# Patient Record
Sex: FEMALE | Race: WHITE | Employment: FULL TIME | URBAN - METROPOLITAN AREA
[De-identification: names, ages, dates, MRNs, and addresses within clinical notes are randomized per-mention and may not be internally consistent; named-entity substitution may affect disease eponyms.]

---

## 2017-01-24 NOTE — TELEPHONE ENCOUNTER
Medication request and it was chosen but please verify the name difference.    estarylla 0.25-0.035 mg table #28, 12 refills, tabe one tablet by mouth one time daily.

## 2017-01-25 RX ORDER — NORGESTIMATE AND ETHINYL ESTRADIOL 0.25-0.035
1 KIT ORAL DAILY
COMMUNITY
End: 2017-01-25 | Stop reason: SDUPTHER

## 2017-01-25 RX ORDER — NORGESTIMATE AND ETHINYL ESTRADIOL 0.25-0.035
1 KIT ORAL DAILY
Qty: 3 DOSE PACK | Refills: 3 | Status: SHIPPED | OUTPATIENT
Start: 2017-01-25 | End: 2017-05-04 | Stop reason: SDUPTHER

## 2017-01-25 RX ORDER — NORGESTIMATE AND ETHINYL ESTRADIOL 0.25-0.035
1 KIT ORAL DAILY
Qty: 1 DOSE PACK | Refills: 12 | Status: CANCELLED | OUTPATIENT
Start: 2017-01-25

## 2017-01-25 NOTE — TELEPHONE ENCOUNTER
VO from Dr. Aguila Draft for pt's OCP to filled for one year and sent to the pt's preferred pharmacy.

## 2017-01-25 NOTE — TELEPHONE ENCOUNTER
Per call from [facility], patient insurance requires 90 day supply for  estarylla 0.25-0.035     Call [phone]    thanks

## 2017-05-04 ENCOUNTER — OFFICE VISIT (OUTPATIENT)
Dept: FAMILY MEDICINE CLINIC | Age: 37
End: 2017-05-04

## 2017-05-04 ENCOUNTER — HOSPITAL ENCOUNTER (OUTPATIENT)
Dept: LAB | Age: 37
Discharge: HOME | End: 2017-05-04
Payer: COMMERCIAL

## 2017-05-04 VITALS
HEART RATE: 90 BPM | OXYGEN SATURATION: 100 % | RESPIRATION RATE: 18 BRPM | TEMPERATURE: 97.8 F | DIASTOLIC BLOOD PRESSURE: 78 MMHG | WEIGHT: 165 LBS | BODY MASS INDEX: 32.39 KG/M2 | SYSTOLIC BLOOD PRESSURE: 123 MMHG | HEIGHT: 60 IN

## 2017-05-04 DIAGNOSIS — Z01.419 WELL WOMAN EXAM: Primary | ICD-10-CM

## 2017-05-04 DIAGNOSIS — Z01.419 WELL WOMAN EXAM WITH ROUTINE GYNECOLOGICAL EXAM: ICD-10-CM

## 2017-05-04 DIAGNOSIS — Z30.09 FAMILY PLANNING: ICD-10-CM

## 2017-05-04 PROCEDURE — 88175 CYTOPATH C/V AUTO FLUID REDO: CPT | Performed by: FAMILY MEDICINE

## 2017-05-04 PROCEDURE — 87624 HPV HI-RISK TYP POOLED RSLT: CPT | Performed by: FAMILY MEDICINE

## 2017-05-04 RX ORDER — NORGESTIMATE AND ETHINYL ESTRADIOL 0.25-0.035
1 KIT ORAL DAILY
Qty: 3 DOSE PACK | Refills: 3 | Status: SHIPPED | OUTPATIENT
Start: 2017-05-04 | End: 2018-07-17 | Stop reason: SDUPTHER

## 2017-05-04 NOTE — LETTER
5/9/2017 8:19 AM 
 
Ms. Brittny White 
[address] [facility] [address] [id number] Dear Brittny White: 
 
Please find your most recent results below. Resulted Orders LIPID PANEL Result Value Ref Range Cholesterol, total 182 100 - 199 mg/dL Triglyceride 109 0 - 149 mg/dL HDL Cholesterol 49 >39 mg/dL VLDL, calculated 22 5 - 40 mg/dL LDL, calculated 111 (H) 0 - 99 mg/dL Narrative Performed at:  [address]  [id number] : Fariha Acosta MD, Phone:  [phone] CBC W/O DIFF Result Value Ref Range WBC 10.2 3.4 - 10.8 x10E3/uL  
 RBC 4.56 3.77 - 5.28 x10E6/uL HGB 12.9 11.1 - 15.9 g/dL HCT 39.3 34.0 - 46.6 % MCV 86 79 - 97 fL  
 MCH 28.3 26.6 - 33.0 pg  
 MCHC 32.8 31.5 - 35.7 g/dL  
 RDW 13.7 12.3 - 15.4 % PLATELET 659 695 - 640 x10E3/uL Narrative Performed at:  [address]  [id number] : Fariha Acosta MD, Phone:  [phone] METABOLIC PANEL, COMPREHENSIVE Result Value Ref Range Glucose 89 65 - 99 mg/dL BUN 9 6 - 20 mg/dL Creatinine 0.73 0.57 - 1.00 mg/dL GFR est non- >59 mL/min/1.73 GFR est  >59 mL/min/1.73  
 BUN/Creatinine ratio 12 9 - 23 Sodium 140 134 - 144 mmol/L Potassium 4.4 3.5 - 5.2 mmol/L Chloride 101 96 - 106 mmol/L  
 CO2 23 18 - 29 mmol/L Calcium 8.8 8.7 - 10.2 mg/dL Protein, total 6.6 6.0 - 8.5 g/dL Albumin 4.1 3.5 - 5.5 g/dL GLOBULIN, TOTAL 2.5 1.5 - 4.5 g/dL A-G Ratio 1.6 1.2 - 2.2 Bilirubin, total 0.3 0.0 - 1.2 mg/dL Alk. phosphatase 76 39 - 117 IU/L  
 AST (SGOT) 16 0 - 40 IU/L  
 ALT (SGPT) 19 0 - 32 IU/L Narrative Performed at:  [address]  [id number] : Fariha Acosta MD, Phone:  [phone] CVD REPORT Result Value Ref Range INTERPRETATION Note Comment:  
   Supplement report is available. Narrative Performed at:  [address] [place] [address]  [id number] : Casandra Bernheim PhD, Phone:  [phone] Labs normal  
Still waiting on PAP smear Please call me if you have any questions: [phone] Sincerely, Fernando Goff MD

## 2017-05-04 NOTE — LETTER
5/18/2017 1:51 PM 
 
Ms. Fabiola To 
[address] [facility] [phone] Dear Fabiola To: 
 
Please find your most recent results below. Resulted Orders CX-VAG CYTOLOGY Narrative Jose [address]  [facility]      [address]         [address] [facility] [facility] [address]      [place], [address]     [place], [address] [facility] 
[phone] Fax# [phone]    Fax# [phone]      Fax# [phone] 
 
========================================================================== 
                       * * * CYTOPATHOLOGY REPORT* * *   
========================================================================== 
GYNECOLOGICAL * * *Procedures/Addenda Present * * * Patient:  Elizabeth Manning             Specimen #:  [id number] Age:  1980 (Age: 40)                Date of Procedure: 5/4/2017 Sex:  F                                  Date of Receipt:  5/5/2017 Hospital#:  [id number]               Date of Report: 5/9/2017 Med. Record #:  [id number] Location:  [facility]) Physician(s):  Glenna P. Angela Galeazzi, MD 
 
   
 
 
 
ADDITIONAL PATIENT INFORMATION:  
RFX 12 , 25 / 39 HPV REGARDLESS:  
YES  
SOURCE: 
A: Cervical/Endocervical Imaged Processed Thin Prep 
 
 
============================================================================ 
                                * * * FINAL INTERPRETATION* * * Cervical/Endocervical Imaged Processed Thin Prep Satisfactory for evaluation. Shift in uma suggestive of Bacterial vaginosis NEGATIVE FOR INTRAEPITHELIAL LESION OR MALIGNANCY. SANTINO Jain (ASCP HPV HR Interpretation Test: HPV, high-risk Result: NEGATIVE Reference Interval: Negative This high-risk HPV test detects fourteen high-risk types 
(16/18/31/33/35/39/45/51/52/56/58/59/66/68) without differentiation, using 
nucleic acid amplification method. Test performed by: [address] [place] [address]  Dr. Juan Tubbs [id number] [place] [address] Phone number:  [phone] Osvaldo Mean * * *Electronically Signed* * * 
5/9/2017 ICD10 Codes: 
 H76.584 The Pap test is a screening procedure to aid in the detection of cervical 
cancer and its precursors. It should not be used as the sole means of 
detecting cervical cancer. As with any laboratory test, false negative 
and false positive results are known to occur. RECOMMENDATIONS: 
 
PAP smear and HPV HR negative Recommend repeat PAP smear in one year Please call me if you have any questions: [phone] Sincerely, [facility] LAB OUTREACH INSURANCE

## 2017-05-04 NOTE — PROGRESS NOTES
Presents for annual exam    Has three children -- 8 9and 3years old -- boys are playing baseball    Been feeling well    Periods -- normal    On birth control -- desires refill    Hx abnormal PAP smear -- last year was MORENITA -- favor reactive, with negative stain for high risk; recommended repeat in 6 months; here today after 13 months    Denies any breast lesions    Pt states that she is fasting today      Subjective:   40 y.o. female for Well Woman Check. Patient's last menstrual period was 04/13/2017. Social History: single partner, contraception - OCP (Oral Contraceptive Pills). Pertinent past medical history:      Patient Active Problem List   Diagnosis Code    ASCUS with positive high risk HPV [id number]    HPV test positive [id number]    Atypical glandular cells of undetermined significance (MORENITA) on cervical Pap smear R87.619     Current Outpatient Prescriptions   Medication Sig Dispense Refill    norgestimate-ethinyl estradiol (ESTARYLLA) 0.25-35 mg-mcg tab Take 1 Tab by mouth daily. 3 Dose Pack 3     No Known Allergies     ROS:  Feeling well. No dyspnea or chest pain on exertion. No abdominal pain, change in bowel habits, black or bloody stools. No urinary tract symptoms. GYN ROS: normal menses, no abnormal bleeding, pelvic pain or discharge, no breast pain or new or enlarging lumps on self exam. No neurological complaints. Objective:     Visit Vitals    BP (!) 141/92 (BP 1 Location: Right arm, BP Patient Position: Sitting)    Pulse 90    Temp 97.8 °F (36.6 °C) (Oral)    Resp 18    Ht 5' (1.524 m)    Wt 165 lb (74.8 kg)    LMP 04/13/2017    SpO2 100%    BMI 32.22 kg/m2     The patient appears well, alert, oriented x 3, in no distress. ENT normal.  Neck supple. No adenopathy or thyromegaly. JASON. Lungs are clear, good air entry, no wheezes, rhonchi or rales. S1 and S2 normal, no murmurs, regular rate and rhythm. Abdomen soft without tenderness, guarding, mass or organomegaly. Extremities show no edema, normal peripheral pulses. Neurological is normal, no focal findings. BREAST EXAM: breasts appear normal, no suspicious masses, no skin or nipple changes or axillary nodes, benign appearing nevus on right nipple    PELVIC EXAM: normal external genitalia, vulva, vagina, cervix, uterus and adnexa    Assessment/Plan:   well woman  pap smear  return annually or prn    ICD-10-CM ICD-9-CM    1. Well woman exam Z01.419 V72.31 LIPID PANEL      CBC W/O DIFF      METABOLIC PANEL, COMPREHENSIVE      PAP IG, APTIMA HPV AND RFX 16/18,45 ([id number])   2. Family planning Z30.09 V25.09    3. Well woman exam with routine gynecological exam Z01.419 V72.31     [V72.31]     current treatment plan is effective, no change in therapy.

## 2017-05-04 NOTE — PROGRESS NOTES
Chief Complaint   Patient presents with    Well Woman     1. Have you been to the ER, urgent care clinic since your last visit? Hospitalized since your last visit? No    2. Have you seen or consulted any other health care providers outside of the [address] [place] [address] since your last visit? Include any pap smears or colon screening.  No

## 2017-05-04 NOTE — MR AVS SNAPSHOT
Visit Information Date & Time Provider Department Dept. Phone Encounter #  
 5/4/2017 10:20 AM Leno Gomez, [address] [facility] [phone] [id number] Follow-up Instructions Return in about 1 year (around 5/4/2018). Follow-up and Disposition History Upcoming Health Maintenance Date Due INFLUENZA AGE 9 TO ADULT 8/1/2017 PAP AKA CERVICAL CYTOLOGY 3/25/2019 DTaP/Tdap/Td series (2 - Td) 11/12/2024 Allergies as of 5/4/2017  Review Complete On: 5/4/2017 By: Leno Gomez MD  
 No Known Allergies Current Immunizations  Reviewed on 1/26/2015 Name Date Influenza Vaccine 10/21/2015, 10/1/2012 Influenza Vaccine (Quad) PF 11/12/2014 Tdap 11/12/2014 Not reviewed this visit You Were Diagnosed With   
  
 Codes Comments Well woman exam    -  Primary ICD-10-CM: Y03.313 ICD-9-CM: V72.31 Family planning     ICD-10-CM: Z30.09 
ICD-9-CM: V25.09 Well woman exam with routine gynecological exam     ICD-10-CM: D32.568 ICD-9-CM: V72.31 [V72.31] Vitals BP Pulse Temp Resp Height(growth percentile) Weight(growth percentile) 123/78 (BP 1 Location: Left arm, BP Patient Position: Sitting) 90 97.8 °F (36.6 °C) (Oral) 18 5' (1.524 m) 165 lb (74.8 kg) LMP SpO2 BMI OB Status Smoking Status 04/13/2017 100% 32.22 kg/m2 Having regular periods Passive Smoke Exposure - Never Smoker Vitals History BMI and BSA Data Body Mass Index Body Surface Area  
 32.22 kg/m 2 1.78 m 2 Preferred Pharmacy Pharmacy Name Phone [facility], [address] [place] [address] Your Updated Medication List  
  
   
This list is accurate as of: 5/4/17  4:41 PM.  Always use your most recent med list.  
  
  
  
  
 norgestimate-ethinyl estradiol 0.25-35 mg-mcg Tab Commonly known as:  ESTARYLLA Take 1 Tab by mouth daily. Prescriptions Sent to Pharmacy Refills  
 norgestimate-ethinyl estradiol (ESTARYLLA) 0.25-35 mg-mcg tab 3 Sig: Take 1 Tab by mouth daily. Class: Normal  
 Pharmacy[facility] [address], [address] #: [phone] Route: Oral  
  
We Performed the Following CBC W/O DIFF [57665 CPT(R)] LIPID PANEL [24463 CPT(R)] METABOLIC PANEL, COMPREHENSIVE [99606 CPT(R)] PAP IG, APTIMA HPV AND RFX 16/18,45 ([id number]) [[id number] Custom] Follow-up Instructions Return in about 1 year (around 5/4/2018). Introducing [facility]! Lucia Flores introduces [employer] patient portal. Now you can access parts of your medical record, email your doctor's office, and request medication refills online. 1. In your internet browser, go to [email or web address] 2. Click on the First Time User? Click Here link in the Sign In box. You will see the New Member Sign Up page. 3. Enter your [employer] Access Code exactly as it appears below. You will not need to use this code after youve completed the sign-up process. If you do not sign up before the expiration date, you must request a new code. · [employer] Access Code: [email or web address] Expires: 8/2/2017 11:12 AM 
 
4. Enter the last four digits of your Social Security Number (xxxx) and Date of Birth (mm/dd/yyyy) as indicated and click Submit. You will be taken to the next sign-up page. 5. Create a [employer] ID. This will be your [employer] login ID and cannot be changed, so think of one that is secure and easy to remember. 6. Create a [employer] password. You can change your password at any time. 7. Enter your Password Reset Question and Answer. This can be used at a later time if you forget your password. 8. Enter your e-mail address. You will receive e-mail notification when new information is available in [address] 9. Click Sign Up. You can now view and download portions of your medical record. 10. Click the Download Summary menu link to download a portable copy of your medical information. If you have questions, please visit the Frequently Asked Questions section of the Fio website. Remember, [employer] is NOT to be used for urgent needs. For medical emergencies, dial 911. Now available from your iPhone and Android! Please provide this summary of care documentation to your next provider. Your primary care clinician is listed as [address] [place] [address]. If you have any questions after today's visit, please call [phone].

## 2017-05-05 LAB
ALBUMIN SERPL-MCNC: 4.1 G/DL (ref 3.5–5.5)
ALBUMIN/GLOB SERPL: 1.6 {RATIO} (ref 1.2–2.2)
ALP SERPL-CCNC: 76 IU/L (ref 39–117)
ALT SERPL-CCNC: 19 IU/L (ref 0–32)
AST SERPL-CCNC: 16 IU/L (ref 0–40)
BILIRUB SERPL-MCNC: 0.3 MG/DL (ref 0–1.2)
BUN SERPL-MCNC: 9 MG/DL (ref 6–20)
BUN/CREAT SERPL: 12 (ref 9–23)
CALCIUM SERPL-MCNC: 8.8 MG/DL (ref 8.7–10.2)
CHLORIDE SERPL-SCNC: 101 MMOL/L (ref 96–106)
CHOLEST SERPL-MCNC: 182 MG/DL (ref 100–199)
CO2 SERPL-SCNC: 23 MMOL/L (ref 18–29)
CREAT SERPL-MCNC: 0.73 MG/DL (ref 0.57–1)
ERYTHROCYTE [DISTWIDTH] IN BLOOD BY AUTOMATED COUNT: 13.7 % (ref 12.3–15.4)
GLOBULIN SER CALC-MCNC: 2.5 G/DL (ref 1.5–4.5)
GLUCOSE SERPL-MCNC: 89 MG/DL (ref 65–99)
HCT VFR BLD AUTO: 39.3 % (ref 34–46.6)
HDLC SERPL-MCNC: 49 MG/DL
HGB BLD-MCNC: 12.9 G/DL (ref 11.1–15.9)
INTERPRETATION, 910389: NORMAL
LDLC SERPL CALC-MCNC: 111 MG/DL (ref 0–99)
MCH RBC QN AUTO: 28.3 PG (ref 26.6–33)
MCHC RBC AUTO-ENTMCNC: 32.8 G/DL (ref 31.5–35.7)
MCV RBC AUTO: 86 FL (ref 79–97)
PLATELET # BLD AUTO: 302 X10E3/UL (ref 150–379)
POTASSIUM SERPL-SCNC: 4.4 MMOL/L (ref 3.5–5.2)
PROT SERPL-MCNC: 6.6 G/DL (ref 6–8.5)
RBC # BLD AUTO: 4.56 X10E6/UL (ref 3.77–5.28)
SODIUM SERPL-SCNC: 140 MMOL/L (ref 134–144)
TRIGL SERPL-MCNC: 109 MG/DL (ref 0–149)
VLDLC SERPL CALC-MCNC: 22 MG/DL (ref 5–40)
WBC # BLD AUTO: 10.2 X10E3/UL (ref 3.4–10.8)

## 2018-07-17 ENCOUNTER — OFFICE VISIT (OUTPATIENT)
Dept: FAMILY MEDICINE CLINIC | Age: 38
End: 2018-07-17

## 2018-07-17 ENCOUNTER — HOSPITAL ENCOUNTER (OUTPATIENT)
Dept: LAB | Age: 38
Discharge: HOME | End: 2018-07-17
Payer: COMMERCIAL

## 2018-07-17 VITALS
DIASTOLIC BLOOD PRESSURE: 84 MMHG | RESPIRATION RATE: 18 BRPM | HEART RATE: 104 BPM | SYSTOLIC BLOOD PRESSURE: 135 MMHG | TEMPERATURE: 98.2 F | HEIGHT: 60 IN | BODY MASS INDEX: 34.47 KG/M2 | OXYGEN SATURATION: 100 % | WEIGHT: 175.6 LBS

## 2018-07-17 DIAGNOSIS — Z01.419 WELL WOMAN EXAM WITH ROUTINE GYNECOLOGICAL EXAM: Primary | ICD-10-CM

## 2018-07-17 DIAGNOSIS — Z87.42 HX OF ABNORMAL CERVICAL PAP SMEAR: ICD-10-CM

## 2018-07-17 DIAGNOSIS — N92.0 MENORRHAGIA WITH REGULAR CYCLE: ICD-10-CM

## 2018-07-17 DIAGNOSIS — E66.09 CLASS 1 OBESITY DUE TO EXCESS CALORIES WITHOUT SERIOUS COMORBIDITY WITH BODY MASS INDEX (BMI) OF 32.0 TO 32.9 IN ADULT: ICD-10-CM

## 2018-07-17 LAB
HCG URINE, QL. (POC): NEGATIVE
VALID INTERNAL CONTROL?: YES

## 2018-07-17 PROCEDURE — 88175 CYTOPATH C/V AUTO FLUID REDO: CPT | Performed by: FAMILY MEDICINE

## 2018-07-17 PROCEDURE — 87624 HPV HI-RISK TYP POOLED RSLT: CPT | Performed by: FAMILY MEDICINE

## 2018-07-17 RX ORDER — NORGESTIMATE AND ETHINYL ESTRADIOL 0.25-0.035
1 KIT ORAL DAILY
Qty: 3 DOSE PACK | Refills: 3 | Status: SHIPPED | OUTPATIENT
Start: 2018-07-17 | End: 2019-06-18 | Stop reason: SDUPTHER

## 2018-07-17 NOTE — PROGRESS NOTES
HPI:  Arleen Walker is a 45 y.o. female presenting for well woman exam.     No complaints or new concerns. Rachele Cooney   - Has been on Estraylla  - Hx of irregular menses that can be heavier when not on OCPs  - On OCPs periods are lighter and not as long    GYN Hx: Onset of menses 5 YO, typically lasting 4-5 days, 28 days between cycles, menses flow light,  LMP 18    OB Hx:  [id number]  1st -   2nd - LTCS for placenta previa, HTN  3rd - RLTCS, HTN  Currently sexually active with one partners in last year, using OCPs/ condoms for family planning    Diet: Eats a lot of pasta, easy to cook meals, microwave, plenty of fruits/ vegetables, 2% milk, no sodas  Exercise: Somewhat, 3x week walking     Dental Exam: 1 year ago  Eye Exam: 1 year ago     Allergies- reviewed:   No Known Allergies    Medications- reviewed:   Current Outpatient Prescriptions   Medication Sig    norgestimate-ethinyl estradiol (ESTARYLLA) 0.25-35 mg-mcg tab Take 1 Tab by mouth daily. No current facility-administered medications for this visit. Past Medical History- reviewed:  Past Medical History:   Diagnosis Date    Abnormal Pap smear     H/O placenta previa          Past Surgical History- reviewed:   Past Surgical History:   Procedure Laterality Date     DELIVERY ONLY      HX GYN  ,      &          Family History - reviewed:  Family History   Problem Relation Age of Onset    Cancer Maternal Grandfather          Social History - reviewed:  Social History     Social History    Marital status:      Spouse name: N/A    Number of children: N/A    Years of education: N/A     Occupational History    Not on file.      Social History Main Topics    Smoking status: Passive Smoke Exposure - Never Smoker    Smokeless tobacco: Never Used    Alcohol use No    Drug use: No    Sexual activity: Yes     Partners: Male     Birth control/ protection: None     Other Topics Concern    Not on file Social History Narrative     Immunizations - reviewed:   Immunization History   Administered Date(s) Administered    Influenza Vaccine 10/01/2012, 10/21/2015    Influenza Vaccine (Quad) PF 11/12/2014    Tdap 11/12/2014     Flu:  This year  Tdap: 2014  Pneumovax: Not indicated  Zostervax: Not indicated      Health Maintenance reviewed -  Pap smear 2016, atypical cells, HPV HR neg, colpo showed atypical cells no MENG, repeat PAP 6 mo later normal, never had repeat PAP at 12 mo  Mammogram No fam hx        Colonoscopy No fam hx  DEXA scan Not indicated   HIV testing Not interested   Hepatitis C testing Not inidicated  Lung cancer screening Not indicated      Review of Systems   Gen: Denies fever, chills, sick contacts  Heme: Denies easy bleeding, bruising  Endocrine: Denies significant weight loss, gain  Cardio: Denies chest pain, palpitations  Lungs: Denies shortness of breath, cough  GI: Denies abdominal pain, melena, n/v, d/c  : Denies dysuria, hematuria  MSK: Denies cramping, weakness  Neuro: Denies vision changes, numbness  Psych: Denies depressive sx, anxiety or trouble sleeping  BREASTS: No masses or nipple discharge    Physical Exam  Visit Vitals    /84 (BP 1 Location: Right arm, BP Patient Position: Sitting)    Pulse (!) 104    Temp 98.2 °F (36.8 °C) (Oral)    Resp 18    Ht 5' (1.524 m)    Wt 175 lb 9.6 oz (79.7 kg)    LMP 07/02/2018    SpO2 100%    BMI 34.29 kg/m2       General appearance - alert, well appearing, and in no distress  Eyes - pupils equal and reactive, extraocular eye movements intact  Ears - bilateral TM's and external ear canals normal  Nose - normal and patent, no erythema, discharge or polyps  Mouth - mucous membranes moist, pharynx normal without lesions  Neck - supple, no significant adenopathy  Chest - clear to auscultation, no wheezes, rales or rhonchi, symmetric air entry  Heart - normal rate, regular rhythm, normal S1, S2, no murmurs, rubs, clicks or gallops  Abdomen - soft, nontender, nondistended, no masses or organomegaly  Neurological - alert, oriented, normal speech, no focal findings or movement disorder noted  Musculoskeletal - no joint tenderness, deformity or swelling  Extremities - peripheral pulses normal, no pedal edema, no clubbing or cyanosis  Skin - normal coloration and turgor, no rashes, no suspicious skin lesions noted  Pelvic - Exam chaperoned by Axel Win LPN. External genitalia normal without rashes or lesions. Pink and moist vaginal mucosa. Scant white discharge. Cervix without lesions or abnormal discharge. Uterus non tender and normal size. No adnexal masses or tenderness. Assessment/Plan:    ICD-10-CM ICD-9-CM    1. Well woman exam with routine gynecological exam Z01.419 V72.31 PAP IG, APTIMA HPV AND RFX 16/18,45 ([id number])      CBC W/O DIFF      METABOLIC PANEL, BASIC      HEMOGLOBIN A1C WITH EAG      LIPID PANEL   2. Menorrhagia with regular cycle N92.0 626.2 norgestimate-ethinyl estradiol (ESTARYLLA) 0.25-35 mg-mcg tab      AMB POC URINE PREGNANCY TEST, VISUAL COLOR COMPARISON   3. Class 1 obesity due to excess calories without serious comorbidity with body mass index (BMI) of 32.0 to 32.9 in adult E66.09 278.00     Z68.32 V85.32    4. Hx of abnormal cervical Pap smear Z87.898 V13.29      Well Woman Exam   - Hx of Atypical Cells, HPV neg. PAP at 12 mo was normal. Needs repeat PAP today following ASCCP guidelines. - BMI obese - recommend healthy diet and exercise  - CBC, BMP, HgA1c, lipid panel today  - UTD on Flu and Tdap    Mennorhagia  - UPT neg  - Refill Estarylla 0.25-35mg-mcg tablet, 3 dose pack, 3 refills    Follow up in 1 year for next well woman exam    I have discussed the diagnosis with the patient and the intended plan as seen in the above orders. Patient verbalized understanding of the plan and agrees with the plan. The patient has received an after-visit summary and questions were answered concerning future plans.   I have discussed medication side effects and warnings with the patient as well. Informed patient to return to the office if new symptoms arise.     Patient discussed with Omkar Aly MD (Attending)    Liborio Morrow DO  Family Medicine Resident

## 2018-07-17 NOTE — PROGRESS NOTES
Identified Patient with two Patient identifiers (Name and ). Two Patient Identifiers confirmed. Reviewed record in preparation for visit and have obtained necessary documentation. Chief Complaint   Patient presents with    Well Woman       Visit Vitals    Ht 5' (1.524 m)    Wt 175 lb 9.6 oz (79.7 kg)    BMI 34.29 kg/m2       1. Have you been to the ER, urgent care clinic since your last visit? Hospitalized since your last visit? No    2. Have you seen or consulted any other health care providers outside of the [address] [place] [address] since your last visit? Include any pap smears or colon screening.  No

## 2018-07-17 NOTE — PATIENT INSTRUCTIONS

## 2018-07-18 LAB
BUN SERPL-MCNC: 7 MG/DL (ref 6–20)
BUN/CREAT SERPL: 11 (ref 9–23)
CALCIUM SERPL-MCNC: 8.6 MG/DL (ref 8.7–10.2)
CHLORIDE SERPL-SCNC: 103 MMOL/L (ref 96–106)
CHOLEST SERPL-MCNC: 176 MG/DL (ref 100–199)
CO2 SERPL-SCNC: 21 MMOL/L (ref 20–29)
CREAT SERPL-MCNC: 0.65 MG/DL (ref 0.57–1)
ERYTHROCYTE [DISTWIDTH] IN BLOOD BY AUTOMATED COUNT: 13.9 % (ref 12.3–15.4)
EST. AVERAGE GLUCOSE BLD GHB EST-MCNC: 100 MG/DL
GLUCOSE SERPL-MCNC: 98 MG/DL (ref 65–99)
HBA1C MFR BLD: 5.1 % (ref 4.8–5.6)
HCT VFR BLD AUTO: 40.1 % (ref 34–46.6)
HDLC SERPL-MCNC: 45 MG/DL
HGB BLD-MCNC: 13.2 G/DL (ref 11.1–15.9)
INTERPRETATION, 910389: NORMAL
LDLC SERPL CALC-MCNC: 108 MG/DL (ref 0–99)
MCH RBC QN AUTO: 29.1 PG (ref 26.6–33)
MCHC RBC AUTO-ENTMCNC: 32.9 G/DL (ref 31.5–35.7)
MCV RBC AUTO: 88 FL (ref 79–97)
PLATELET # BLD AUTO: 294 X10E3/UL (ref 150–379)
POTASSIUM SERPL-SCNC: 4.1 MMOL/L (ref 3.5–5.2)
RBC # BLD AUTO: 4.54 X10E6/UL (ref 3.77–5.28)
SODIUM SERPL-SCNC: 143 MMOL/L (ref 134–144)
TRIGL SERPL-MCNC: 113 MG/DL (ref 0–149)
VLDLC SERPL CALC-MCNC: 23 MG/DL (ref 5–40)
WBC # BLD AUTO: 9.8 X10E3/UL (ref 3.4–10.8)

## 2018-07-23 ENCOUNTER — TELEPHONE (OUTPATIENT)
Dept: FAMILY MEDICINE CLINIC | Age: 38
End: 2018-07-23

## 2018-07-23 NOTE — TELEPHONE ENCOUNTER
7/23/2018 4:56 PM    Called patient to discuss recent lab results. Went over blood work. PAP showed MORENITA and based on ASCCP guidelines will need repeat colpo. Scheduled for August 3rd at 10:30 AM. Have discussed with Dr. Franck Stinson who will be on this day.     Aniyah Lyles DO  Family Med Resident, PGY2

## 2018-08-03 ENCOUNTER — OFFICE VISIT (OUTPATIENT)
Dept: FAMILY MEDICINE CLINIC | Age: 38
End: 2018-08-03

## 2018-08-03 VITALS
HEART RATE: 88 BPM | HEIGHT: 60 IN | RESPIRATION RATE: 17 BRPM | SYSTOLIC BLOOD PRESSURE: 119 MMHG | BODY MASS INDEX: 33.96 KG/M2 | WEIGHT: 173 LBS | DIASTOLIC BLOOD PRESSURE: 75 MMHG | OXYGEN SATURATION: 99 % | TEMPERATURE: 98.3 F

## 2018-08-03 DIAGNOSIS — R87.619 ATYPICAL GLANDULAR CELLS OF UNDETERMINED SIGNIFICANCE (AGUS) ON CERVICAL PAP SMEAR: Primary | ICD-10-CM

## 2018-08-03 LAB
HCG URINE, QL. (POC): NEGATIVE
VALID INTERNAL CONTROL?: YES

## 2018-08-03 NOTE — PROGRESS NOTES
Chief Complaint Patient presents with  Colposcopy 1. Have you been to the ER, urgent care clinic since your last visit? Hospitalized since your last visit? No 
 
2. Have you seen or consulted any other health care providers outside of the [address] [place] [address] since your last visit? Include any pap smears or colon screening.  No

## 2018-08-03 NOTE — PROGRESS NOTES
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 Discussed concerns about repeating endometrial biopsy and colpo including sample size and if we may be missing cancer not sampling the entire endometrial lining. Discussed her options for care including following up with Rayray Benz for possible D&C which would provide better sample size of cells or repeating colpo and endometrial biopsy today. After discussing her options and risks/ benefits of both patient has opted to follow up with OB/ GYN. Will provide referral for OB/ GYN. Patient seen and discussed with Dr. Dat Maya DO (Attending) Pauline Burger DO Family Med Resident, PGY2

## 2018-08-08 ENCOUNTER — TELEPHONE (OUTPATIENT)
Dept: OBGYN CLINIC | Age: 38
End: 2018-08-08

## 2018-08-08 NOTE — TELEPHONE ENCOUNTER
Call received at 3:06PM      45year old patient calling to say that she needs to reschedule her appointment for the colposcopy due to being on her cycle. Patient placed on the schedule for 8/27/18 at 3:40PM.    Patient advised to take Motrin one hour prior to the procedure. Patient verbalized understanding.

## 2018-08-27 ENCOUNTER — HOSPITAL ENCOUNTER (OUTPATIENT)
Dept: LAB | Age: 38
Discharge: HOME | End: 2018-08-27

## 2018-08-27 ENCOUNTER — OFFICE VISIT (OUTPATIENT)
Dept: OBGYN CLINIC | Age: 38
End: 2018-08-27

## 2018-08-27 VITALS
SYSTOLIC BLOOD PRESSURE: 122 MMHG | HEIGHT: 60 IN | BODY MASS INDEX: 34.51 KG/M2 | DIASTOLIC BLOOD PRESSURE: 82 MMHG | WEIGHT: 175.8 LBS

## 2018-08-27 DIAGNOSIS — Z87.42 HISTORY OF ABNORMAL CERVICAL PAP SMEAR: ICD-10-CM

## 2018-08-27 DIAGNOSIS — Z32.02 NEGATIVE PREGNANCY TEST: ICD-10-CM

## 2018-08-27 DIAGNOSIS — R87.619 ATYPICAL GLANDULAR CELLS OF UNDETERMINED SIGNIFICANCE (AGUS) ON CERVICAL PAP SMEAR: Primary | ICD-10-CM

## 2018-08-27 NOTE — PROGRESS NOTES
[facility]  OFFICE PROCEDURE PROGRESS NOTE        Chart reviewed for the following:   IChelsea LPN, have reviewed the History, Physical and updated the Allergic reactions for [address] performed immediately prior to start of procedure:   Keren Weir LPN, have performed the following reviews on [employer] prior to the start of the procedure:            * Patient was identified by name and date of birth   * Agreement on procedure being performed was verified  * Risks and Benefits explained to the patient  * Procedure site verified and marked as necessary  * Patient was positioned for comfort  * Consent was signed and verified     Time: 4:07 PM        Date of procedure: 2018    Procedure performed by:  Hannah Galan MD    How tolerated by patient: tolerated the procedure well with no complications    Post Procedural Pain Scale: 2 - Hurts Little Bit    Comments: none    Procedure note: Endometrial biopsy    Principal Financial is a ,  45 y.o. female [facility] whose Patient's last menstrual period was 2018 (exact date). was on . The patient has a history of There were no encounter diagnoses. and presents for an endometrial biopsy. Indications:   After the indications, risks, benefits, and alternatives to performing an endometrial biopsy were explained to the patient, her questions were answered and informed consent was obtained. Procedure: The patient was placed on the table in the dorsal lithotomy position. A bimanual exam showed the uterus to be anterior. The uterus was normal size. {Numbers 1-15,20,30:16485} weeks  size. A speculum was placed in the vagina. The cervix was visualized and prepped with zephrin. A tenaculum was placed on the anterior lip of the cervix for traction. It was not necessary to dilate the cervix. A pipelle was passed through the endocervical canal without difficulty.  The uterus was sounded to {NUMBERS; 1-9.5 BY 3.0:54335} cm's. A moderate amount of tissue was returned. This tissue was placed in formalin and sent to pathology. It was felt that an adequate sample was obtained. due to cervical stenosis. The patient tolerated the procedure well and she reported mild cramping. The tenaculum and speculum were removed. Post Procedural Status: The patient was observed for {Numbers 1-15,20,30:24243}  {hrs/mins:14337. She had mild cramping at the time of discharge. There were no complications. The patient was discharged in stable condition. Procedure Note: Colposcopy    Maria De Jesus Hickey is a ,  45 y.o. female [facility] whose Patient's last menstrual period was 2018 (exact date). was on . She presents for colposcopy for evaluation of a cervical abnormality with a pap smear abnormality consisting of MORENITA/-HPV obtained on 2018. Hx of Normal pap/-HPV on 2017. JO ANN/-HPV on 3/25/2016 Negative colposcopy and EMB on 2016. Cassandrakatiuska Mireles After being presented with the risks, benefits and alternatives has she signed a consent for the procedure. She states that she understands the need for the procedure and has no further questions. She was informed that she may experience discomfort. Procedure:  She was positioned in the dorsal lithotomy position and a speculum was inserted into the vagina. Dilute acetic acid was applied to the cervix. The colposcope was used to visualize the cervix. Procedure: The transformation zone was completely visualized. Findings: This colposcopy was satisfactory. Biopsies were taken from the cervix . See accompanying diagram for biopsy sites. Monsels was applied to the cervix. Endocervical currettage: An endocervical curettage was performed. Findings:The procedure was notable for white epithelium on the cervix. Post Procedure Status: The patient tolerated the procedure well with minimal discomfort.

## 2018-08-27 NOTE — PROGRESS NOTES
Abnormal pap evaluation     Subjective:      Slime Owens is a 45 y.o. female seen for evaluation of abnormal Pap smear. Most recent Pap smear 18 result: MORENITA neg HPV  Prior Pap result: Normal, HPV neg 2017. Prior cervical/vaginal disease: none. Prior cervical treatment: MORENITA pap, favor reactive 3/2016 and Colpo and emb were neg 2016  Cervical cancer risk factors: prior abnormal Pap smears    Past Medical History:   Diagnosis Date    Abnormal Pap smear     H/O placenta previa      Past Surgical History:   Procedure Laterality Date     DELIVERY ONLY      HX GYN  ,      &      Social History     Occupational History    Not on file. Social History Main Topics    Smoking status: Passive Smoke Exposure - Never Smoker    Smokeless tobacco: Never Used    Alcohol use No    Drug use: No    Sexual activity: Yes     Partners: Male     Birth control/ protection: None     Family History   Problem Relation Age of Onset    Cancer Maternal Grandfather        No Known Allergies  Prior to Admission medications    Medication Sig Start Date End Date Taking? Authorizing Provider   norgestimate-ethinyl estradiol (ESTARYLLA) 0.25-35 mg-mcg tab Take 1 Tab by mouth daily.  18 Yes Parish Blank, DO        Review of Systems - History obtained from the patient-negative for:  Constitutional: weight loss, fever, night sweats  GI: change in bowel habits, abdominal pain, black or bloody stools  : frequency, dysuria, hematuria, vaginal discharge  MSK: back pain, joint pain, muscle pain  Skin: itching, rash, hives  Neuro: dizziness, headache, confusion, weakness  Psych: anxiety, depression, change in mood  Heme/lymph: bleeding, bruising, pallor       Objective:     Visit Vitals    /82    Ht 5' (1.524 m)    Wt 175 lb 12.8 oz (79.7 kg)    LMP 2018 (Exact Date)    BMI 34.33 kg/m2        Physical Exam:     Constitutional  · Appearance: well-nourished, well developed, alert, in no acute distress    HENT  · Head and Face: appears normal    Gastrointestinal  · Abdominal Examination: abdomen non-tender to palpation, normal bowel sounds, no masses present  · Liver and spleen: no hepatomegaly present, spleen not palpable  · Hernias: no hernias identified    Genitourinary  · External Genitalia: normal appearance for age, no discharge present, no tenderness present, no inflammatory lesions present, no masses present, no atrophy present  · Vagina: normal vaginal vault without central or paravaginal defects, no discharge present, no inflammatory lesions present, no masses present  · Bladder: non-tender to palpation  · Urethra: appears normal  · Cervix: normal   · Perineum: perineum within normal limits, no evidence of trauma, no rashes or skin lesions present  · Anus: anus within normal limits, no hemorrhoids present  · Inguinal Lymph Nodes: no lymphadenopathy present    Skin  · General Inspection: no rash, no lesions identified    Neurologic/Psychiatric  · Mental Status:  · Orientation: grossly oriented to person, place and time  · Mood and Affect: mood normal, affect appropriate            Assessment/Plan:   MORENITA pap - prior MORENITA favor reactive in 2016 with neg bx., pap normal HPV margarito 2017    P: colpo and end bx done today  Will schedule screening mammo  Await path      [facility]  OFFICE PROCEDURE PROGRESS NOTE        Chart reviewed for the following:   I, April Clare, have reviewed the History, Physical and updated the Allergic reactions for [address] performed immediately prior to start of procedure:   I April Clare, have performed the following reviews on Buzz Chen prior to the start of the procedure:            * Patient was identified by name and date of birth   * Agreement on procedure being performed was verified  * Risks and Benefits explained to the patient  * Procedure site verified and marked as necessary  * Patient was positioned for comfort  * Consent was signed and verified     Time: 4:33pm      Date of procedure: 8/27/2018    Procedure performed by:  Craig Mckeon MD    Patient assisted by: self    How tolerated by patient: tolerated the procedure well with no complications    Post Procedural Pain Scale: 2 - Hurts Little Bit    Comments: none    Procedure Note: Colposcopy    Kris Handley is a G4 ,  45 y.o. female [facility] whose Patient's last menstrual period was 07/31/2018 (exact date). She presents for colposcopy for evaluation of a cervical abnormality with a pap smear abnormality consisting of MORENITA neg HPV. After being presented with the risks, benefits and alternatives has she signed a consent for the procedure. She states that she understands the need for the procedure and has no further questions. She was informed that she may experience discomfort. Procedure:  She was positioned in the dorsal lithotomy position and a speculum was inserted into the vagina. Dilute acetic acid was applied to the cervix. The colposcope was used to visualize the cervix. Procedure: The transformation zone was completely visualized. Findings: This colposcopy was satisfactory. A biopsy were taken from the cervix . See accompanying diagram for biopsy sites. Monsels was applied to the cervix. Endocervical currettage: An endocervical curettage was performed. Findings:The procedure was notable for normal epithelium on the cervix. Post Procedure Status: The patient tolerated the procedure well with minimal discomfort. Procedure note: Endometrial biopsy    Kris Handley is a G4 [id number],  45 y.o. female [facility] whose Patient's last menstrual period was 07/31/2018 (exact date). The patient has a history of The primary encounter diagnosis was Atypical glandular cells of undetermined significance (MORENITA) on cervical Pap smear. A diagnosis of History of abnormal cervical Pap smear was also pertinent to this visit.  and presents for an endometrial biopsy. Indications:   After the indications, risks, benefits, and alternatives to performing an endometrial biopsy were explained to the patient, her questions were answered and informed consent was obtained. Procedure: The patient was placed on the table in the dorsal lithotomy position. The uterus was normal size. A speculum was placed in the vagina. The cervix was visualized and prepped with zephrin. A tenaculum was not needed for traction. It was not necessary to dilate the cervix. A pipelle was passed through the endocervical canal without difficulty. The uterus was sounded to 7 cm's. A moderate amount of tissue was returned. This tissue was placed in formalin and sent to pathology. It was felt that an adequate sample was obtained. The patient tolerated the procedure well and she reported mild cramping. The speculum was removed. Post Procedural Status: The patient was observed for 15 mins. She had no cramping at the time of discharge. There were no complications. The patient was discharged in stable condition.

## 2018-08-28 LAB
HCG URINE, QL. (POC): NEGATIVE
VALID INTERNAL CONTROL?: YES

## 2018-08-30 ENCOUNTER — APPOINTMENT (OUTPATIENT)
Dept: OBGYN CLINIC | Age: 38
End: 2018-08-30

## 2019-02-25 ENCOUNTER — OFFICE VISIT (OUTPATIENT)
Dept: OBGYN CLINIC | Age: 39
End: 2019-02-25

## 2019-02-25 VITALS
BODY MASS INDEX: 34.75 KG/M2 | WEIGHT: 177 LBS | HEIGHT: 60 IN | SYSTOLIC BLOOD PRESSURE: 110 MMHG | DIASTOLIC BLOOD PRESSURE: 70 MMHG

## 2019-02-25 DIAGNOSIS — Z86.19 HISTORY OF HPV INFECTION: ICD-10-CM

## 2019-02-25 DIAGNOSIS — Z87.42 HISTORY OF ABNORMAL CERVICAL PAP SMEAR: Primary | ICD-10-CM

## 2019-02-25 NOTE — PROGRESS NOTES
Chief Complaint   Follow-up      HPI  Jaquelin Bernard is a 45 y.o. female who presents for a 6 month follow up pap smear. She was last seen 2018   No LMP recorded. She has hx of ASCUS/+HPV, 2013  MORENITA/-HPV 3/25/2016 & 2018  EMB negative;Colposcopy revealed atypical detached cell 2018  Associated symptoms: none. Aggravating symptoms: none. Alleviating factors: none. Annual exam due 2019. Normal mammo 2018        Past Medical History:   Diagnosis Date    Abnormal Pap smear     H/O placenta previa      Past Surgical History:   Procedure Laterality Date     DELIVERY ONLY      HX COLPOSCOPY  2018    MORENITA pap--> colpo and EMB neg    HX GYN  ,      &      Social History     Occupational History    Not on file   Tobacco Use    Smoking status: Passive Smoke Exposure - Never Smoker    Smokeless tobacco: Never Used   Substance and Sexual Activity    Alcohol use: No    Drug use: No    Sexual activity: Yes     Partners: Male     Birth control/protection: None     Family History   Problem Relation Age of Onset    Cancer Maternal Grandfather        No Known Allergies  Prior to Admission medications    Medication Sig Start Date End Date Taking? Authorizing Provider   norgestimate-ethinyl estradiol (ESTARYLLA) 0.25-35 mg-mcg tab Take 1 Tab by mouth daily. 18  Cam Lemus DO        Review of Systems: History obtained from the patient  Constitutional: negative for weight loss, fever, night sweats  Breast: negative for breast lumps, nipple discharge, galactorrhea  GI: negative for change in bowel habits, abdominal pain, black or bloody stools  : negative for frequency, dysuria, hematuria, vaginal discharge  MSK: negative for back pain, joint pain, muscle pain  Skin: negative for itching, rash, hives  Psych: negative for anxiety, depression, change in mood      Objective: There were no vitals taken for this visit.     Physical Exam: PHYSICAL EXAMINATION    Constitutional  · Appearance: well-nourished, well developed, alert, in no acute distress    Gastrointestinal  · Abdominal Examination: abdomen non-tender to palpation, normal bowel sounds, no masses present  · Liver and spleen: no hepatomegaly present, spleen not palpable  · Hernias: no hernias identified    Genitourinary  · External Genitalia: normal appearance for age, no discharge present, no tenderness present, no inflammatory lesions present, no masses present, no atrophy present  · Vagina: normal vaginal vault without central or paravaginal defects, no discharge present, no inflammatory lesions present, no masses present  · Bladder: non-tender to palpation  · Urethra: appears normal  · Cervix: normal   · Uterus: normal size, shape and consistency  · Adnexa: no adnexal tenderness present, no adnexal masses present  · Perineum: perineum within normal limits, no evidence of trauma, no rashes or skin lesions present  · Anus: anus within normal limits, no hemorrhoids present  · Inguinal Lymph Nodes: no lymphadenopathy present    Skin  · General Inspection: no rash, no lesions identified    Neurologic/Psychiatric  · Mental Status:  · Orientation: grossly oriented to person, place and time  · Mood and Affect: mood normal, affect appropriate    Assessment:   MORENITA with normal workup    Plan:   Pap/HPV      RTO prn if symptoms persist or worsen. Instructions given to pt. Handouts given to pt.

## 2019-02-28 LAB
CYTOLOGIST CVX/VAG CYTO: NORMAL
CYTOLOGY CVX/VAG DOC THIN PREP: NORMAL
DX ICD CODE: NORMAL
HPV I/H RISK 1 DNA CVX QL PROBE+SIG AMP: NEGATIVE
Lab: NORMAL
OTHER STN SPEC: NORMAL
PATH REPORT.FINAL DX SPEC: NORMAL
STAT OF ADQ CVX/VAG CYTO-IMP: NORMAL

## 2019-06-18 ENCOUNTER — OFFICE VISIT (OUTPATIENT)
Dept: FAMILY MEDICINE CLINIC | Age: 39
End: 2019-06-18

## 2019-06-18 VITALS
WEIGHT: 175 LBS | OXYGEN SATURATION: 98 % | HEIGHT: 60 IN | RESPIRATION RATE: 16 BRPM | HEART RATE: 83 BPM | SYSTOLIC BLOOD PRESSURE: 120 MMHG | BODY MASS INDEX: 34.36 KG/M2 | TEMPERATURE: 98.5 F | DIASTOLIC BLOOD PRESSURE: 81 MMHG

## 2019-06-18 DIAGNOSIS — Z00.00 WELL WOMAN EXAM (NO GYNECOLOGICAL EXAM): Primary | ICD-10-CM

## 2019-06-18 DIAGNOSIS — N94.6 DYSMENORRHEA: ICD-10-CM

## 2019-06-18 DIAGNOSIS — N92.0 MENORRHAGIA WITH REGULAR CYCLE: ICD-10-CM

## 2019-06-18 LAB
HCG URINE, QL. (POC): NEGATIVE
VALID INTERNAL CONTROL?: YES

## 2019-06-18 RX ORDER — NORGESTIMATE AND ETHINYL ESTRADIOL 0.25-0.035
1 KIT ORAL DAILY
Qty: 3 DOSE PACK | Refills: 3 | Status: SHIPPED | OUTPATIENT
Start: 2019-06-18 | End: 2020-06-17

## 2019-06-18 NOTE — PROGRESS NOTES
HPI:  Monica Ruggiero is a 44 y.o. female presenting for well woman exam.       Concerns today:   Dysmenorrhea - stable. Needs refill of OCP; Has been on for about 4 years. Lifestyle:   Occupation: insurance billing for [employer]: fruits, vegetable, protein. Some carb intake. Drinks water. Exercise: walk about 45 minutes, 3-4 days/ week   Tobacco: never smoker  Alcohol: social drink  Drugs: never smoker       Health Maintenance:  Pap smear: neg pap and HPV 2019 with OBGYN  Mammogram: no masses. No FH of breast cancer  Colonoscopy: no melena. No FH of colon cancer  DEXA scan: not indicated until 73 y/o   HIV testing: Neg    Hepatitis C testing: not indicated. Born after 1965  Lung cancer screening: not indicated. Never smoker  Vision screening: about 1 year ago   Dental screening: about 6 months ago       Immunizations:   Immunization History   Administered Date(s) Administered    Influenza Vaccine 10/01/2012, 10/21/2015    Influenza Vaccine (Quad) PF 2014    Tdap 2014         Menstrual, Pregnancy, and Sexual Histories:  Age at which menses began: 5 y/o  Last menstrual period was: 19  Length of periods: 4-5 days  Number of days between periods: 28 days   Menstrual flow: regular on OCP         Sexually active?: yes   Number of sexual partners: 1  Type of sexual partners: male  Method of family planning: OCP      No Known Allergies      Current Outpatient Medications   Medication Sig    norgestimate-ethinyl estradiol (ESTARYLLA) 0.25-35 mg-mcg tab Take 1 Tab by mouth daily. No current facility-administered medications for this visit.           Past Medical History:   Diagnosis Date    Abnormal Pap smear     H/O placenta previa          Past Surgical History:   Procedure Laterality Date     DELIVERY ONLY      HX COLPOSCOPY  2018    MORENITA pap--> colpo and EMB neg    HX GYN  ,      &          Family History   Problem Relation Age of Onset    Cancer Maternal Grandfather          Social History     Socioeconomic History    Marital status:      Spouse name: Not on file    Number of children: Not on file    Years of education: Not on file    Highest education level: Not on file   Occupational History    Not on file   Social Needs    Financial resource strain: Not on file    Food insecurity:     Worry: Not on file     Inability: Not on file    Transportation needs:     Medical: Not on file     Non-medical: Not on file   Tobacco Use    Smoking status: Passive Smoke Exposure - Never Smoker    Smokeless tobacco: Never Used   Substance and Sexual Activity    Alcohol use: No    Drug use: No    Sexual activity: Yes     Partners: Male     Birth control/protection: None   Lifestyle    Physical activity:     Days per week: Not on file     Minutes per session: Not on file    Stress: Not on file   Relationships    Social connections:     Talks on phone: Not on file     Gets together: Not on file     Attends [employer] service: Not on file     Active member of club or organization: Not on file     Attends meetings of clubs or organizations: Not on file     Relationship status: Not on file    Intimate partner violence:     Fear of current or ex partner: Not on file     Emotionally abused: Not on file     Physically abused: Not on file     Forced sexual activity: Not on file   Other Topics Concern    Not on file   Social History Narrative    Not on file       Review of Systems   Constitutional: Negative. Negative for chills, diaphoresis, fever, malaise/fatigue and weight loss. HENT: Negative. Negative for hearing loss. Eyes: Negative. Negative for blurred vision. Respiratory: Negative. Negative for cough, shortness of breath and wheezing. Cardiovascular: Negative. Negative for chest pain and palpitations. Gastrointestinal: Negative. Negative for abdominal pain, blood in stool, constipation, diarrhea, nausea and vomiting. Genitourinary: Negative. Negative for dysuria and hematuria. Musculoskeletal: Negative. Skin: Negative for rash. Neurological: Negative. Negative for dizziness, sensory change and headaches. Physical Exam  Visit Vitals  /81 (BP 1 Location: Left arm, BP Patient Position: Sitting)   Pulse 83   Temp 98.5 °F (36.9 °C) (Oral)   Resp 16   Ht 5' (1.524 m)   Wt 175 lb (79.4 kg)   LMP 05/29/2019 (Exact Date)   SpO2 98%   BMI 34.18 kg/m²     Physical Exam   Constitutional: She is oriented to person, place, and time. She appears well-developed and well-nourished. No distress. HENT:   Head: Normocephalic and atraumatic. Mouth/Throat: Oropharynx is clear and moist.   Eyes: Pupils are equal, round, and reactive to light. Conjunctivae and EOM are normal. No scleral icterus. Neck: Neck supple. Cardiovascular: Normal rate and regular rhythm. Pulmonary/Chest: Effort normal and breath sounds normal. No respiratory distress. She has no wheezes. She has no rales. Abdominal: Soft. Bowel sounds are normal. She exhibits no distension. There is no tenderness. There is no guarding. Musculoskeletal: She exhibits no edema or tenderness. Neurological: She is alert and oriented to person, place, and time. She exhibits normal muscle tone. Coordination normal.   Skin: Skin is warm and dry. She is not diaphoretic. Psychiatric: She has a normal mood and affect. Her behavior is normal.   Nursing note and vitals reviewed. Assessment/Plan:    Concerns today:  Dysmenorrhea - stable on OCP  - POC UPT neg  - refill OCP     Lifestyle:  - Body mass index is 34.18 kg/m². Discussed that pt's BMI is in the obese range and encouraged continued efforts with diet and exercise  -Counseled regarding diet, exercise, healthy lifestyle    Health Maintenance:  - CBC, BMP, Lipid    Immunizations:  - TDAP up to date     Reproductive and Sexual History:   - neg HIV 2009  - Hep C not indicated due to age   - POC UPT neg. I have discussed the diagnosis with the patient and the intended plan as seen in the above orders. The patient has received an after-visit summary and questions were answered concerning future plans. I have discussed medication side effects and warnings with the patient as well. Informed pt to return to the office if new symptoms arise. Patient discussed with Dr. Lety Navarro, Attending Physician. Beka Call MD  Family Medicine Resident        Encounter Diagnoses:    ICD-10-CM ICD-9-CM    1. Well woman exam (no gynecological exam) Z00.00 V70.0 CBC W/O DIFF      METABOLIC PANEL, BASIC      LIPID PANEL   2. Dysmenorrhea N94.6 625.3 AMB POC URINE PREGNANCY TEST, VISUAL COLOR COMPARISON   3.  Menorrhagia with regular cycle N92.0 626.2 norgestimate-ethinyl estradiol (ESTARYLLA) 0.25-35 mg-mcg tab

## 2019-06-18 NOTE — PROGRESS NOTES
[address] Medicine Residency Attending Addendum:  Patient encounter was discussed on the day of the encounter with Aj Douglass MD, performing the key elements of the service. I discussed the findings, assessment and plan with the resident and agree with the resident's findings and plan as documented in the resident's note.       Fide Sharma MD, CAQSM, RMSK

## 2019-06-18 NOTE — PATIENT INSTRUCTIONS
Well Visit, Ages 25 to 48: Care Instructions  Your Care Instructions    Physical exams can help you stay healthy. Your doctor has checked your overall health and may have suggested ways to take good care of yourself. He or she also may have recommended tests. At home, you can help prevent illness with healthy eating, regular exercise, and other steps. Follow-up care is a key part of your treatment and safety. Be sure to make and go to all appointments, and call your doctor if you are having problems. It's also a good idea to know your test results and keep a list of the medicines you take. How can you care for yourself at home? · Reach and stay at a healthy weight. This will lower your risk for many problems, such as obesity, diabetes, heart disease, and high blood pressure. · Get at least 30 minutes of physical activity on most days of the week. Walking is a good choice. You also may want to do other activities, such as running, swimming, cycling, or playing tennis or team sports. Discuss any changes in your exercise program with your doctor. · Do not smoke or allow others to smoke around you. If you need help quitting, talk to your doctor about stop-smoking programs and medicines. These can increase your chances of quitting for good. · Talk to your doctor about whether you have any risk factors for sexually transmitted infections (STIs). Having one sex partner (who does not have STIs and does not have sex with anyone else) is a good way to avoid these infections. · Use birth control if you do not want to have children at this time. Talk with your doctor about the choices available and what might be best for you. · Protect your skin from too much sun. When you're outdoors from 10 a.m. to 4 p.m., stay in the shade or cover up with clothing and a hat with a wide brim. Wear sunglasses that block UV rays. Even when it's cloudy, put broad-spectrum sunscreen (SPF 30 or higher) on any exposed skin.   · See a dentist one or two times a year for checkups and to have your teeth cleaned. · Wear a seat belt in the car. · Drink alcohol in moderation, if at all. That means no more than 2 drinks a day for men and 1 drink a day for women. Follow your doctor's advice about when to have certain tests. These tests can spot problems early. For everyone  · Cholesterol. Have the fat (cholesterol) in your blood tested after age 21. Your doctor will tell you how often to have this done based on your age, family history, or other things that can increase your risk for heart disease. · Blood pressure. Have your blood pressure checked during a routine doctor visit. Your doctor will tell you how often to check your blood pressure based on your age, your blood pressure results, and other factors. · Vision. Talk with your doctor about how often to have a glaucoma test.  · Diabetes. Ask your doctor whether you should have tests for diabetes. · Colon cancer. Have a test for colon cancer at age 48. You may have one of several tests. If you are younger than 48, you may need a test earlier if you have any risk factors. Risk factors include whether you already had a precancerous polyp removed from your colon or whether your parent, brother, sister, or child has had colon cancer. For women  · Breast exam and mammogram. Talk to your doctor about when you should have a clinical breast exam and a mammogram. Medical experts differ on whether and how often women under 50 should have these tests. Your doctor can help you decide what is right for you. · Pap test and pelvic exam. Begin Pap tests at age 24. A Pap test is the best way to find cervical cancer. The test often is part of a pelvic exam. Ask how often to have this test.  · Tests for sexually transmitted infections (STIs). Ask whether you should have tests for STIs. You may be at risk if you have sex with more than one person, especially if your partners do not wear condoms.   For men  · Tests for sexually transmitted infections (STIs). Ask whether you should have tests for STIs. You may be at risk if you have sex with more than one person, especially if you do not wear a condom. · Testicular cancer exam. Ask your doctor whether you should check your testicles regularly. · Prostate exam. Talk to your doctor about whether you should have a blood test (called a PSA test) for prostate cancer. Experts differ on whether and when men should have this test. Some experts suggest it if you are older than 39 and are -American or have a father or brother who got prostate cancer when he was younger than 72. When should you call for help? Watch closely for changes in your health, and be sure to contact your doctor if you have any problems or symptoms that concern you. Where can you learn more? Go to http://josef-rayne.info/. Enter P072 in the search box to learn more about \"Well Visit, Ages 25 to 48: Care Instructions. \"  Current as of: March 28, 2018  Content Version: 11.9  © 6735-7922 [employer]. Care instructions adapted under license by [employer] (which disclaims liability or warranty for this information). If you have questions about a medical condition or this instruction, always ask your healthcare professional. [address] any warranty or liability for your use of this information. Heart-Healthy Diet: Care Instructions  Your Care Instructions    A heart-healthy diet has lots of vegetables, fruits, nuts, beans, and whole grains, and is low in salt. It limits foods that are high in saturated fat, such as meats, cheeses, and fried foods. It may be hard to change your diet, but even small changes can lower your risk of heart attack and heart disease. Follow-up care is a key part of your treatment and safety. Be sure to make and go to all appointments, and call your doctor if you are having problems.  It's also a good idea to know your test results and keep a list of the medicines you take. How can you care for yourself at home? Watch your portions  · Learn what a serving is. A \"serving\" and a \"portion\" are not always the same thing. Make sure that you are not eating larger portions than are recommended. For example, a serving of pasta is ½ cup. A serving size of meat is 2 to 3 ounces. A 3-ounce serving is about the size of a deck of cards. Measure serving sizes until you are good at [place]" them. Keep in mind that restaurants often serve portions that are 2 or 3 times the size of one serving. · To keep your energy level up and keep you from feeling hungry, eat often but in smaller portions. · Eat only the number of calories you need to stay at a healthy weight. If you need to lose weight, eat fewer calories than your body burns (through exercise and other physical activity). Eat more fruits and vegetables  · Eat a variety of fruit and vegetables every day. Dark green, deep orange, red, or yellow fruits and vegetables are especially good for you. Examples include spinach, carrots, peaches, and berries. · Keep carrots, celery, and other veggies handy for snacks. Buy fruit that is in season and store it where you can see it so that you will be tempted to eat it. · Cook dishes that have a lot of veggies in them, such as stir-fries and soups. Limit saturated and trans fat  · Read food labels, and try to avoid saturated and trans fats. They increase your risk of heart disease. Trans fat is found in many processed foods such as cookies and crackers. · Use olive or canola oil when you cook. Try cholesterol-lowering spreads, such as Benecol or Take Control. · Bake, broil, grill, or steam foods instead of frying them. · Choose lean meats instead of high-fat meats such as hot dogs and sausages. Cut off all visible fat when you prepare meat.   · Eat fish, skinless poultry, and meat alternatives such as soy products instead of high-fat meats. Soy products, such as tofu, may be especially good for your heart. · Choose low-fat or fat-free milk and dairy products. Eat fish  · Eat at least two servings of fish a week. Certain fish, such as salmon and tuna, contain omega-3 fatty acids, which may help reduce your risk of heart attack. Eat foods high in fiber  · Eat a variety of grain products every day. Include whole-grain foods that have lots of fiber and nutrients. Examples of whole-grain foods include oats, whole wheat bread, and brown rice. · Buy whole-grain breads and cereals, instead of white bread or pastries. Limit salt and sodium  · Limit how much salt and sodium you eat to help lower your blood pressure. · Taste food before you salt it. Add only a little salt when you think you need it. With time, your taste buds will adjust to less salt. · Eat fewer snack items, fast foods, and other high-salt, processed foods. Check food labels for the amount of sodium in packaged foods. · Choose low-sodium versions of canned goods (such as soups, vegetables, and beans). Limit sugar  · Limit drinks and foods with added sugar. These include candy, desserts, and soda pop. Limit alcohol  · Limit alcohol to no more than 2 drinks a day for men and 1 drink a day for women. Too much alcohol can cause health problems. When should you call for help? Watch closely for changes in your health, and be sure to contact your doctor if:    · You would like help planning heart-healthy meals. Where can you learn more? Go to http://josef-rayne.info/. Enter V137 in the search box to learn more about \"Heart-Healthy Diet: Care Instructions. \"  Current as of: July 22, 2018  Content Version: 11.9  © 8800-6147 [employer]. Care instructions adapted under license by [employer] (which disclaims liability or warranty for this information).  If you have questions about a medical condition or this instruction, always ask your healthcare professional. [place] [address] any warranty or liability for your use of this information.

## 2019-06-18 NOTE — PROGRESS NOTES
No chief complaint on file. 1. Have you been to the ER, urgent care clinic since your last visit? Hospitalized since your last visit? No    2. Have you seen or consulted any other health care providers outside of the [address] [place] [address] since your last visit? Include any pap smears or colon screening.  No

## 2019-06-19 LAB
BUN SERPL-MCNC: 9 MG/DL (ref 6–20)
BUN/CREAT SERPL: 11 (ref 9–23)
CALCIUM SERPL-MCNC: 9.4 MG/DL (ref 8.7–10.2)
CHLORIDE SERPL-SCNC: 106 MMOL/L (ref 96–106)
CHOLEST SERPL-MCNC: 178 MG/DL (ref 100–199)
CO2 SERPL-SCNC: 22 MMOL/L (ref 20–29)
CREAT SERPL-MCNC: 0.79 MG/DL (ref 0.57–1)
ERYTHROCYTE [DISTWIDTH] IN BLOOD BY AUTOMATED COUNT: 14.2 % (ref 12.3–15.4)
GLUCOSE SERPL-MCNC: 93 MG/DL (ref 65–99)
HCT VFR BLD AUTO: 41.8 % (ref 34–46.6)
HDLC SERPL-MCNC: 47 MG/DL
HGB BLD-MCNC: 13.7 G/DL (ref 11.1–15.9)
INTERPRETATION, 910389: NORMAL
LDLC SERPL CALC-MCNC: 112 MG/DL (ref 0–99)
MCH RBC QN AUTO: 28.7 PG (ref 26.6–33)
MCHC RBC AUTO-ENTMCNC: 32.8 G/DL (ref 31.5–35.7)
MCV RBC AUTO: 87 FL (ref 79–97)
PLATELET # BLD AUTO: 285 X10E3/UL (ref 150–450)
POTASSIUM SERPL-SCNC: 4.3 MMOL/L (ref 3.5–5.2)
RBC # BLD AUTO: 4.78 X10E6/UL (ref 3.77–5.28)
SODIUM SERPL-SCNC: 141 MMOL/L (ref 134–144)
TRIGL SERPL-MCNC: 93 MG/DL (ref 0–149)
VLDLC SERPL CALC-MCNC: 19 MG/DL (ref 5–40)
WBC # BLD AUTO: 8.3 X10E3/UL (ref 3.4–10.8)

## 2020-01-31 NOTE — MR AVS SNAPSHOT
[address] 
[phone] Patient: Monica Ruggiero MRN: [id number] NANNETTE:[birth year] Visit Information Date & Time Provider Department Dept. Phone Encounter #  
 8/3/2018 10:45 AM Glen Martino, [address] [phone] [id number] Your Appointments 8/20/2018  3:10 PM  
PROCEDURE with MD Sushant De Luna ([address]) Appt Note: colpo, ok per ah  
 [address] [facility] [address] [facility] [address]  
[facility] [address] Upcoming Health Maintenance Date Due Influenza Age 5 to Adult 8/1/2018 PAP AKA CERVICAL CYTOLOGY 7/17/2021 DTaP/Tdap/Td series (2 - Td) 11/12/2024 Allergies as of 8/3/2018  Review Complete On: 8/3/2018 By: Patrick Tam LPN No Known Allergies Current Immunizations  Reviewed on 1/26/2015 Name Date Influenza Vaccine 10/21/2015, 10/1/2012 Influenza Vaccine (Quad) PF 11/12/2014 Tdap 11/12/2014 Not reviewed this visit You Were Diagnosed With   
  
 Codes Comments Atypical glandular cells of undetermined significance (MORENITA) on cervical Pap smear    -  Primary ICD-10-CM: R87.619 ICD-9-CM: 795.00 Vitals BP Pulse Temp Resp Height(growth percentile) Weight(growth percentile) 119/75 88 98.3 °F (36.8 °C) (Oral) 17 5' (1.524 m) 173 lb (78.5 kg) LMP SpO2 BMI OB Status Smoking Status 07/31/2018 (Exact Date) 99% 33.79 kg/m2 Having regular periods Passive Smoke Exposure - Never Smoker Vitals History BMI and BSA Data Body Mass Index Body Surface Area 33.79 kg/m 2 1.82 m 2 Preferred Pharmacy Pharmacy Name Phone [address] [facility] [address] ([address] [phone] Your Updated Medication List  
  
   
 This list is accurate as of 8/3/18  5:01 PM.  Always use your most recent med list.  
  
  
  
  
 norgestimate-ethinyl estradiol 0.25-35 mg-mcg Tab Commonly known as:  ESTARYLLA Take 1 Tab by mouth daily. We Performed the Following AMB POC URINE PREGNANCY TEST, VISUAL COLOR COMPARISON [77277 CPT(R)] REFERRAL TO GYNECOLOGY [REF30 Custom] Referral Information Referral ID Referred By Referred To  
  
 [id number] Aditya ARREGUIN MD   
   [address] [place] [address] Phone: [phone] Fax: [phone] Visits Status Start Date End Date 1 New Request 8/3/18 8/3/19 If your referral has a status of pending review or denied, additional information will be sent to support the outcome of this decision. Introducing [facility]! [facility] introduces [employer] patient portal. Now you can access parts of your medical record, email your doctor's office, and request medication refills online. 1. In your internet browser, go to [email or web address] 2. Click on the First Time User? Click Here link in the Sign In box. You will see the New Member Sign Up page. 3. Enter your Office Max Access Code exactly as it appears below. You will not need to use this code after youve completed the sign-up process. If you do not sign up before the expiration date, you must request a new code. · Office Max Access Code: [email or web address] Expires: 10/15/2018  1:24 PM 
 
4. Enter the last four digits of your Social Security Number (xxxx) and Date of Birth (mm/dd/yyyy) as indicated and click Submit. You will be taken to the next sign-up page. 5. Create a [employer]t ID. This will be your Office Max login ID and cannot be changed, so think of one that is secure and easy to remember. 6. Create a [employer] password. You can change your password at any time. 7. Enter your Password Reset Question and Answer. This can be used at a later time if you forget your password. 8. Enter your e-mail address. You will receive e-mail notification when new information is available in [address] 9. Click Sign Up. You can now view and download portions of your medical record. 10. Click the Download Summary menu link to download a portable copy of your medical information. If you have questions, please visit the Frequently Asked Questions section of the [employer] website. Remember, [employer] is NOT to be used for urgent needs. For medical emergencies, dial 911. Now available from your iPhone and Android! Please provide this summary of care documentation to your next provider. Your primary care clinician is listed as [facility], . If you have any questions after today's visit, please call [phone]. 45

## 2021-05-19 ENCOUNTER — DOCUMENTATION ONLY (OUTPATIENT)
Dept: OBGYN CLINIC | Age: 41
End: 2021-05-19

## 2021-05-19 NOTE — PROGRESS NOTES
Unable to reach pt in regards to scheduling AE/pap    Filing to chart. FW: lesli  Due: 3 months ago Received: 3 months ago   Patient reminder  ROSS Mcmahon LPN  Certfied letter sent 1/21/21   #[id number]           Previous Messages       ----- Message -----   From: Jacob Gilman LPN   Sent: 42/53/8850   2:54 PM EST   To: Tisha Khan LPN   Subject: FW: lesli                                       No appt Letter sent   ----- Message -----   From: Jacob Gilman LPN   Sent: 6/9/1970   1:03 PM EDT   To: Tisha Khan LPN   Subject: FW: lesli                                       No appt. Letter sent   ----- Message -----   From: Janae Lozano LPN   Sent: 8/42/3253   1:26 PM EDT   To: Tisha Khan LPN   Subject: FW: lesli                                           ----- Message -----   FromTawny Ziegler LPN   Sent: 9/04/0096  11:08 AM   To: April Hogjakob   Subject: FW: lesli                                       2/2019 neg pap and neg HPV   ----- Message -----   From: Clare April   Sent: 9/6/2018   7:30 PM   To:  April Hogjakob   Subject: lesli                                           MORENITA pap; Colpo and emb WNL-----RP in 6 months

## 2022-12-16 ENCOUNTER — OFFICE VISIT (OUTPATIENT)
Dept: OBGYN CLINIC | Age: 42
End: 2022-12-16
Payer: COMMERCIAL

## 2022-12-16 VITALS
SYSTOLIC BLOOD PRESSURE: 146 MMHG | DIASTOLIC BLOOD PRESSURE: 87 MMHG | BODY MASS INDEX: 34.2 KG/M2 | WEIGHT: 174.2 LBS | HEIGHT: 60 IN

## 2022-12-16 DIAGNOSIS — N63.11 MASS OF UPPER OUTER QUADRANT OF RIGHT BREAST: Primary | ICD-10-CM

## 2022-12-16 NOTE — PROGRESS NOTES
Dung Ramirez is a 43 y.o. female presents for a problem visit. No chief complaint on file. LMP: 11/23/22  Birth Control: condoms always. Last Pap: NILM, HPV negative 2/25/19. MORENITA, HPV negative 7/17/18, Colpo 8/27/18 normal.     The patient is reporting having: right breast lump (larger than a quarter) and sharp shooting intermittent pain she noticed the beginning of this week. Right breast also appears larger than left. Denies discoloration, dimpling, nipple retraction, or discharge. She reports the symptoms are is unchanged. Aggravating factors include touch. And alleviating factors include none. 1. Have you been to the ER, urgent care clinic, or hospitalized since your last visit? No    2. Have you seen or consulted any other health care providers outside of the [address] [place] [address] since your last visit? No    Examination chaperoned by Jovan Blum RN.

## 2022-12-21 ENCOUNTER — OFFICE VISIT (OUTPATIENT)
Dept: SURGERY | Age: 42
End: 2022-12-21
Payer: COMMERCIAL

## 2022-12-21 VITALS — BODY MASS INDEX: 34.16 KG/M2 | HEIGHT: 60 IN | WEIGHT: 174 LBS

## 2022-12-21 DIAGNOSIS — N63.11 MASS OF UPPER OUTER QUADRANT OF RIGHT BREAST: ICD-10-CM

## 2022-12-21 DIAGNOSIS — Z12.31 BREAST CANCER SCREENING BY MAMMOGRAM: Primary | ICD-10-CM

## 2022-12-21 NOTE — PROGRESS NOTES
HISTORY OF PRESENT ILLNESS  Kathleene Fleischer is a 43 y.o. female. HPI NEW patient consult referred by  Dr. Lissa Deleon for RIGHT breast cyst. Noticed a lump on the RIGHT breast about a week ago and feels like the area may be smaller. There way some slight tenderness when she first felt the lump. Denies other changes to the breast area. Family History:  None      Breast imaging-   [facility] Results (most recent):  Results from Appointment encounter on 08/30/18    [facility] 3D NAOMI W MAMMO BI SCREENING INCL CAD    Narrative  STUDY: Bilateral digital screening mammogram with 3-D tomosynthesis    INDICATION:  Screening. COMPARISON:  None. This is a baseline study. BREAST COMPOSITION:  The breasts are heterogeneously dense, which may obscure  small masses. FINDINGS: Bilateral digital screening mammography was performed and is  interpreted in conjunction with a computer assisted detection (CAD) system. Additionally, tomosynthesis of both breasts in the CC and MLO projections was  performed. No suspicious masses or calcifications are identified. There are  small intramammary lymph nodes in both breasts. There has been no significant  change. Impression  IMPRESSION:  BI-RADS 2: Benign. No mammographic evidence of malignancy. Intramammary lymph  nodes in both breasts are benign in appearance. RECOMMENDATIONS:  Next screening mammogram is recommended at age 36. The patient will be notified of these results.         ROS    Physical Exam    ASSESSMENT and PLAN  {ASSESSMENT/PLAN:89189}

## 2022-12-21 NOTE — PROGRESS NOTES
HISTORY OF PRESENT ILLNESS  Deejay Terrell is a 43 y.o. female. HPI  NEW patient consult referred by  Dr. Charlotte Tervizo for RIGHT breast cyst. Noticed a mass on the RIGHT breast about a week ago and feels like the area may be smaller. There way some slight tenderness when she first felt the mas. Denies other changes to the breast area. Family History:  None            Breast imaging-   [facility] Results (most recent):  Results from Appointment encounter on 18     [facility] 3D NAOMI W MAMMO BI SCREENING INCL CAD     Narrative  STUDY: Bilateral digital screening mammogram with 3-D tomosynthesis     INDICATION:  Screening. COMPARISON:  None. This is a baseline study. BREAST COMPOSITION:  The breasts are heterogeneously dense, which may obscure  small masses. FINDINGS: Bilateral digital screening mammography was performed and is  interpreted in conjunction with a computer assisted detection (CAD) system. Additionally, tomosynthesis of both breasts in the CC and MLO projections was  performed. No suspicious masses or calcifications are identified. There are  small intramammary lymph nodes in both breasts. There has been no significant  change. Impression  IMPRESSION:  BI-RADS 2: Benign. No mammographic evidence of malignancy. Intramammary lymph  nodes in both breasts are benign in appearance. RECOMMENDATIONS:  Next screening mammogram is recommended at age 36. The patient will be notified of these results.       Past Medical History:   Diagnosis Date    Abnormal Pap smear     H/O placenta previa        Past Surgical History:   Procedure Laterality Date    HX COLPOSCOPY  2018    MORENITA pap--> colpo and EMB neg    HX GYN  ,      &     [place]  DELIVERY ONLY         Social History     Socioeconomic History    Marital status:      Spouse name: Not on file    Number of children: Not on file    Years of education: Not on file    Highest education level: Not on file Occupational History    Not on file   Tobacco Use    Smoking status: Passive Smoke Exposure - Never Smoker    Smokeless tobacco: Never   Vaping Use    Vaping Use: Never used   Substance and Sexual Activity    Alcohol use: No    Drug use: No    Sexual activity: Yes     Partners: Male     Birth control/protection: Condom   Other Topics Concern    Not on file   Social History Narrative    Not on file     Social Determinants of Health     Financial Resource Strain: Not on file   Food Insecurity: Not on file   Transportation Needs: Not on file   Physical Activity: Not on file   Stress: Not on file   Social Connections: Not on file   Intimate Partner Violence: Not on file   Housing Stability: Not on file       No current outpatient medications on file prior to visit. No current facility-administered medications on file prior to visit. No Known Allergies    OB History    This patient's OB History needs to be verified. Open OB History to review and resolve any issues.    4    Para   3    Term   3            AB   1    Living   3         SAB   1    IAB        Ectopic        Molar        Multiple   1    Live Births   1          Obstetric Comments   Menarche 6, LMP current, # of children 1, age of 4st delivery 32, Hysterectomy/oophorectomy No/No, Breast bx No, history of breast feeding Yes, BCP Yes, Hormone therapy No                ROS        Physical Exam  Exam conducted with a chaperone present. Constitutional:       Appearance: She is well-developed. She is not diaphoretic. HENT:      Head: Normocephalic and atraumatic. Right Ear: External ear normal.      Left Ear: External ear normal.   Eyes:      General: No scleral icterus. Right eye: No discharge. Left eye: No discharge. Pupils: Pupils are equal, round, and reactive to light. Neck:      Thyroid: No thyromegaly. Vascular: No JVD. Trachea: No tracheal deviation.    Cardiovascular:      Rate and Rhythm: Normal rate and regular rhythm. Heart sounds: Normal heart sounds. Pulmonary:      Effort: Pulmonary effort is normal. No tachypnea, accessory muscle usage or respiratory distress. Breath sounds: Normal breath sounds. No stridor. Chest:   Breasts:     Breasts are symmetrical.      Right: No inverted nipple, mass, nipple discharge, skin change or tenderness. Left: No inverted nipple, mass, nipple discharge, skin change or tenderness. Abdominal:      General: There is no distension. Palpations: Abdomen is soft. There is no mass. Tenderness: There is no abdominal tenderness. Musculoskeletal:         General: Normal range of motion. Cervical back: Normal range of motion and neck supple. Lymphadenopathy:      Cervical: No cervical adenopathy. Skin:     General: Skin is warm and dry. Neurological:      Mental Status: She is alert and oriented to person, place, and time. Psychiatric:         Speech: Speech normal.         Behavior: Behavior normal.         Thought Content: Thought content normal.         Judgment: Judgment normal.           BREAST ULTRASOUND  Indication: RIGHT breast mass 12:00  Technique: The area was scanned using a high-frequency linear-array near-field transducer  Findings: simple moderate sized cyst  Impression: Benign cyst  Disposition: follow up as needed. ASSESSMENT and PLAN    ICD-10-CM ICD-9-CM    1. Breast cancer screening by mammogram  Z12.31 V76.12 NINI 3D NAOMI W MAMMO BI DX INCL CAD      2. Mass of upper outer quadrant of right breast  N63.11 611.72 NINI 3D NAOMI W MAMMO BI DX INCL CAD      US BREAST RT LIMITED=<3 QUAD        New patient presents for evaluation of RIGHT breast, and is doing well overall. Upon physical examination noted asymptomatic palpable mass at 12:00 of RIGHT breast. Ultrasound visualizes simple moderate sized cyst. Advised patient she is due for mammogram, I will put order in.  I also informed the patient if cyst becomes too bothersome or big to come in for aspiration. Follow up as needed. This plan was reviewed with the patient and patient agrees. All questions were answered. Total time spent was 40 minutes.       Written by Melissa Hills, as dictated by Dr. Bridger Chavez MD.

## 2023-04-17 NOTE — PROGRESS NOTES
Yvonne Li is a 37 y.o. female returns for an annual exam     No chief complaint on file. No LMP recorded. Her periods are {bleeding vaginal:61146::\"normal\"} in flow and {cycle hx:21353::\"usually regular with a 26-32 day interval with 3-7 day duration\"}. She {has-does not have:55638552} dysmenorrhea. Problems: {problem:61110}  Birth Control: {contraception:199767}. Last Pap: NILM, HPV negative 2/25/19  She does not have a history of MENG 2, 3 or cervical cancer. She does have a history of MORENITA pap   Last Mammogram: had her mammogram today in our office. It was see report   Last colonoscopy: {NORMAL_ABNORMAL:80757::\"see report\"} obtained {Numbers; 1-4 :[id number]} year(s) ago. 1. Have you been to the ER, urgent care clinic, or hospitalized since your last visit? {Yes when where and reason for visit:20441}    2. Have you seen or consulted any other health care providers outside of the [address] [place] [address] since your last visit? {Yes when where and reason for visit:20441}    Examination chaperoned by Lisa Guerra RN.

## 2023-04-18 ENCOUNTER — OFFICE VISIT (OUTPATIENT)
Dept: OBGYN CLINIC | Age: 43
End: 2023-04-18

## 2023-04-18 VITALS — WEIGHT: 182.6 LBS | BODY MASS INDEX: 35.66 KG/M2 | SYSTOLIC BLOOD PRESSURE: 125 MMHG | DIASTOLIC BLOOD PRESSURE: 84 MMHG

## 2023-04-18 DIAGNOSIS — Z01.419 ENCOUNTER FOR GYNECOLOGICAL EXAMINATION: Primary | ICD-10-CM

## 2023-04-18 DIAGNOSIS — N92.0 MENORRHAGIA WITH REGULAR CYCLE: ICD-10-CM

## 2023-04-18 PROCEDURE — 99396 PREV VISIT EST AGE 40-64: CPT | Performed by: OBSTETRICS & GYNECOLOGY

## 2023-04-18 RX ORDER — NORETHINDRONE ACETATE AND ETHINYL ESTRADIOL 1MG-20(24)
1 KIT ORAL DAILY
Qty: 3 DOSE PACK | Refills: 4 | Status: SHIPPED | OUTPATIENT
Start: 2023-04-18

## 2023-04-18 NOTE — PROGRESS NOTES
Sara Schafer is a ,  37 y.o. female [address] [facility] whose LMP was on 3/27/2023 who presents for her annual checkup. She is having no significant problems. Menstrual status:    Her periods are heavy in flow, regular    She denies dysmenorrhea. Contraception:    The current method of family planning is condoms always. Sexual history:    She  reports being sexually active and has had partner(s) who are male. She reports using the following method of birth control/protection: Condom. Pap and Mammogram History:    Last Pap: NILM, HPV negative 19  She does not have a history of MENG 2, 3 or cervical cancer. She does have a history of MORENITA pap, nl fu x 1  Last Mammogram: had her mammogram today in our office. Breast Cancer History    She has no family history of breast cancer. Family History   Problem Relation Age of Onset    Cancer Maternal Grandfather        Past Medical History:   Diagnosis Date    Abnormal Pap smear     H/O placenta previa      Past Surgical History:   Procedure Laterality Date    HX COLPOSCOPY  2018    MORENITA pap--> colpo and EMB neg    HX GYN  ,      &     [place]  DELIVERY ONLY         Allergies: Patient has no known allergies.    Social History     Socioeconomic History    Marital status:      Spouse name: Not on file    Number of children: Not on file    Years of education: Not on file    Highest education level: Not on file   Occupational History    Not on file   Tobacco Use    Smoking status: Passive Smoke Exposure - Never Smoker    Smokeless tobacco: Never   Vaping Use    Vaping Use: Never used   Substance and Sexual Activity    Alcohol use: No    Drug use: No    Sexual activity: Yes     Partners: Male     Birth control/protection: Condom   Other Topics Concern    Not on file   Social History Narrative    Not on file     Social Determinants of Health     Financial Resource Strain: Not on file   Food Insecurity: Not on file Transportation Needs: Not on file   Physical Activity: Not on file   Stress: Not on file   Social Connections: Not on file   Intimate Partner Violence: Not on file   Housing Stability: Not on file     Tobacco History:  reports that she is a non-smoker but has been exposed to tobacco smoke. She has never used smokeless tobacco.  Alcohol Abuse:  reports no history of alcohol use. Drug Abuse:  reports no history of drug use.   Patient Active Problem List   Diagnosis Code    ASCUS with positive high risk HPV [id number]    HPV test positive [id number]    Atypical glandular cells of undetermined significance (MORENITA) on cervical Pap smear R87.619         Review of Systems - History obtained from the patient  Constitutional: negative for weight loss, fever, night sweats  HEENT: negative for hearing loss, earache, congestion, snoring, sorethroat  CV: negative for chest pain, palpitations, edema  Resp: negative for cough, shortness of breath, wheezing  GI: negative for change in bowel habits, abdominal pain, black or bloody stools  : negative for frequency, dysuria, hematuria, vaginal discharge  MSK: negative for back pain, joint pain, muscle pain  Breast: negative for breast lumps, nipple discharge, galactorrhea  Skin :negative for itching, rash, hives  Neuro: negative for dizziness, headache, confusion, weakness  Psych: negative for anxiety, depression, change in mood  Heme/lymph: negative for bleeding, bruising, pallor    Physical Exam    Visit Vitals  /84   Wt 182 lb 9.6 oz (82.8 kg)   LMP 03/27/2023 (Exact Date)   BMI 35.66 kg/m²     Constitutional  Appearance: well-nourished, well developed, alert, in no acute distress    HENT  Head and Face: appears normal    Neck  Inspection/Palpation: normal appearance, no masses or tenderness  Lymph Nodes: no lymphadenopathy present  Thyroid: gland size normal, nontender, no nodules or masses present on palpation    Chest  Respiratory Effort: breathing normal  Auscultation: normal breath sounds    Cardiovascular  Heart: Auscultation: regular rate and rhythm without murmur    Breasts  Inspection of Breasts: breasts symmetrical, no skin changes, no discharge present, nipple appearance normal, no skin retraction present  Palpation of Breasts and Axillae: no masses present on palpation, no breast tenderness  Axillary Lymph Nodes: no lymphadenopathy present    Gastrointestinal  Abdominal Examination: abdomen non-tender to palpation, normal bowel sounds, no masses present  Liver and spleen: no hepatomegaly present, spleen not palpable  Hernias: no hernias identified    Skin  General Inspection: no rash, no lesions identified    Neurologic/Psychiatric  Mental Status:  Orientation: grossly oriented to person, place and time  Mood and Affect: mood normal, affect appropriate    Genitourinary  External Genitalia: normal appearance for age, no discharge present, no tenderness present, no inflammatory lesions present, no masses present, no atrophy present  Vagina: normal vaginal vault without central or paravaginal defects, no discharge present, no inflammatory lesions present, no masses present  Bladder: non-tender to palpation  Urethra: appears normal  Cervix: normal   Uterus: normal size, shape and consistency  Adnexa: no adnexal tenderness present, no adnexal masses present  Perineum: perineum within normal limits, no evidence of trauma, no rashes or skin lesions present  Anus: anus within normal limits, no hemorrhoids present  Inguinal Lymph Nodes: no lymphadenopathy present    Assessment:  Routine gynecologic examination  Her current medical status is satisfactory with no evidence of significant gynecologic issues.   menorrhagia  Plan:  Counseled re: diet, exercise, healthy lifestyle  Return for yearly wellness visits  Rec annual mammogram  Pap/HPV  Start OCP

## 2023-04-18 NOTE — PROGRESS NOTES
Della Hernández is a 37 y.o. female returns for an annual exam     Chief Complaint   Patient presents with    Well Woman         Patient's last menstrual period was 03/27/2023 (exact date). Her periods are  heavy on first 2 days then light for another 4 days  in flow and usually regular with a 26-32 day interval with 3-7 day duration. She does not have dysmenorrhea. Problems:  wants to discuss starting birth control pills  Birth Control: condoms always. Last Pap: NILM, HPV negative 2/25/19  She does not have a history of MENG 2, 3 or cervical cancer. She does have a history of MORENITA pap   Last Mammogram: had her mammogram today in our office. 1. Have you been to the ER, urgent care clinic, or hospitalized since your last visit? No    2. Have you seen or consulted any other health care providers outside of the [address] [place] [address] since your last visit? No    Examination chaperoned by Namrata Martinez LPN.

## 2023-04-22 LAB
CYTOLOGIST CVX/VAG CYTO: NORMAL
CYTOLOGY CVX/VAG DOC CYTO: NORMAL
CYTOLOGY CVX/VAG DOC THIN PREP: NORMAL
CYTOLOGY HISTORY:: NORMAL
DX ICD CODE: NORMAL
HPV GENOTYPE REFLEX: NORMAL
HPV I/H RISK 4 DNA CVX QL PROBE+SIG AMP: NEGATIVE
Lab: NORMAL
OTHER STN SPEC: NORMAL
STAT OF ADQ CVX/VAG CYTO-IMP: NORMAL

## 2024-04-16 ENCOUNTER — PATIENT MESSAGE (OUTPATIENT)
Age: 44
End: 2024-04-16

## 2024-04-16 RX ORDER — NORETHINDRONE ACETATE AND ETHINYL ESTRADIOL AND FERROUS FUMARATE 1MG-20(24)
1 KIT ORAL DAILY
COMMUNITY
Start: 2024-03-16 | End: 2024-04-18 | Stop reason: SDUPTHER

## 2024-04-18 ENCOUNTER — TELEPHONE (OUTPATIENT)
Age: 44
End: 2024-04-18

## 2024-04-18 RX ORDER — NORETHINDRONE ACETATE AND ETHINYL ESTRADIOL AND FERROUS FUMARATE 1MG-20(24)
1 KIT ORAL DAILY
Qty: 2 PACKET | Refills: 0 | Status: SHIPPED | OUTPATIENT
Start: 2024-04-18

## 2024-04-18 NOTE — TELEPHONE ENCOUNTER
PT name and  verified    45 yo last ov 23, next ae 24    PT calling from  message sent for refill request for her ocp since her ae got rescheduled.   PT requesting for refills for rx:Norethin Ace-Eth Estrad-FE (JUNEL FE 24) 1-20 MG-MCG() TABS   Per MD verbal order, 2 packs sent to her pharmacy to get her to her ae 24.  Advised PT to keep appt to get further refills by MD.  PT verbalizes understanding.

## 2024-05-28 ENCOUNTER — OFFICE VISIT (OUTPATIENT)
Age: 44
End: 2024-05-28
Payer: COMMERCIAL

## 2024-05-28 VITALS
BODY MASS INDEX: 34.71 KG/M2 | SYSTOLIC BLOOD PRESSURE: 124 MMHG | WEIGHT: 176.8 LBS | DIASTOLIC BLOOD PRESSURE: 77 MMHG | HEIGHT: 60 IN

## 2024-05-28 DIAGNOSIS — Z01.419 ENCOUNTER FOR GYNECOLOGICAL EXAMINATION (GENERAL) (ROUTINE) WITHOUT ABNORMAL FINDINGS: Primary | ICD-10-CM

## 2024-05-28 PROCEDURE — 99396 PREV VISIT EST AGE 40-64: CPT | Performed by: OBSTETRICS & GYNECOLOGY

## 2024-05-28 RX ORDER — NORETHINDRONE ACETATE AND ETHINYL ESTRADIOL AND FERROUS FUMARATE 1MG-20(24)
1 KIT ORAL DAILY
Qty: 3 PACKET | Refills: 4 | Status: SHIPPED | OUTPATIENT
Start: 2024-05-28

## 2024-05-28 SDOH — ECONOMIC STABILITY: HOUSING INSECURITY
IN THE LAST 12 MONTHS, WAS THERE A TIME WHEN YOU DID NOT HAVE A STEADY PLACE TO SLEEP OR SLEPT IN A SHELTER (INCLUDING NOW)?: PATIENT DECLINED

## 2024-05-28 SDOH — ECONOMIC STABILITY: FOOD INSECURITY: WITHIN THE PAST 12 MONTHS, THE FOOD YOU BOUGHT JUST DIDN'T LAST AND YOU DIDN'T HAVE MONEY TO GET MORE.: PATIENT DECLINED

## 2024-05-28 SDOH — ECONOMIC STABILITY: INCOME INSECURITY: HOW HARD IS IT FOR YOU TO PAY FOR THE VERY BASICS LIKE FOOD, HOUSING, MEDICAL CARE, AND HEATING?: PATIENT DECLINED

## 2024-05-28 SDOH — ECONOMIC STABILITY: FOOD INSECURITY: WITHIN THE PAST 12 MONTHS, YOU WORRIED THAT YOUR FOOD WOULD RUN OUT BEFORE YOU GOT MONEY TO BUY MORE.: PATIENT DECLINED

## 2024-05-28 ASSESSMENT — PATIENT HEALTH QUESTIONNAIRE - PHQ9
SUM OF ALL RESPONSES TO PHQ9 QUESTIONS 1 & 2: 0
SUM OF ALL RESPONSES TO PHQ QUESTIONS 1-9: 0
SUM OF ALL RESPONSES TO PHQ QUESTIONS 1-9: 0
2. FEELING DOWN, DEPRESSED OR HOPELESS: NOT AT ALL
SUM OF ALL RESPONSES TO PHQ QUESTIONS 1-9: 0
1. LITTLE INTEREST OR PLEASURE IN DOING THINGS: NOT AT ALL
SUM OF ALL RESPONSES TO PHQ QUESTIONS 1-9: 0

## 2024-05-28 NOTE — PROGRESS NOTES
Lilliam Car is a 44 y.o. female returns for an annual exam     Chief Complaint   Patient presents with    Annual Exam       Patient's last menstrual period was 05/12/2024.  Her periods are light, moderate in flow and usually regular with a 26-32 day interval with 3-7 day duration.  She does not have dysmenorrhea.  Problems: no problems  Birth Control: OCP (estrogen/progesterone).  Last Pap: 4/18/2023 normal/HPV neg  She does not have a history of NAVARRO 2, 3 or cervical cancer.   Last Mammogram: had her mammogram today in our office.        Examination chaperoned by Mimi James MA.  
appropriate    Genitourinary  External Genitalia: normal appearance for age, no discharge present, no tenderness present, no inflammatory lesions present, no masses present, no atrophy present  Vagina: normal vaginal vault without central or paravaginal defects, no discharge present, no inflammatory lesions present, no masses present  Bladder: non-tender to palpation  Urethra: appears normal  Cervix: normal   Uterus: normal size, shape and consistency  Adnexa: no adnexal tenderness present, no adnexal masses present  Perineum: perineum within normal limits, no evidence of trauma, no rashes or skin lesions present  Anus: anus within normal limits, no hemorrhoids present  Inguinal Lymph Nodes: no lymphadenopathy present    Assessment:  Routine gynecologic examination  Her current medical status is satisfactory with no evidence of significant gynecologic issues.    Plan:  Counseled re: diet, exercise, healthy lifestyle  Return for yearly wellness visits  Rec annual mammogram  Cont OCP

## 2025-07-23 NOTE — PROGRESS NOTES
Lilliam Car is a 45 y.o. female returns for an annual exam     Annual Exam    No LMP recorded.  Her periods are {bleeding vaginal:64153} in flow and {cycle hx:36395}.  She {has-does not have:97327294} dysmenorrhea.  Problems: {problem:53262}  Birth Control: OCP (estrogen/progesterone).  Last Pap: normal obtained 2 year(s) ago.  She does not have a history of NAVARRO 2, 3 or cervical cancer.   Last Mammogram: had her mammogram today in our office.  See report.   Last colonoscopy: {Normal_Abnormal:39950::\"see report\"} obtained {Numbers; 1-5:64038920} year(s) ago.      1. Have you been to the ER, urgent care clinic, or hospitalized since your last visit? No    2. Have you seen or consulted any other health care providers outside of the [facility] since your last visit? No    Examination chaperoned by Fernando Shaw LPN.

## 2025-07-24 ENCOUNTER — OFFICE VISIT (OUTPATIENT)
Age: 45
End: 2025-07-24

## 2025-07-24 VITALS
BODY MASS INDEX: 34.08 KG/M2 | SYSTOLIC BLOOD PRESSURE: 148 MMHG | HEIGHT: 60 IN | DIASTOLIC BLOOD PRESSURE: 95 MMHG | HEART RATE: 98 BPM | WEIGHT: 173.6 LBS

## 2025-07-24 DIAGNOSIS — Z87.42 HISTORY OF MENORRHAGIA: ICD-10-CM

## 2025-07-24 DIAGNOSIS — Z01.419 ENCOUNTER FOR GYNECOLOGICAL EXAMINATION (GENERAL) (ROUTINE) WITHOUT ABNORMAL FINDINGS: Primary | ICD-10-CM

## 2025-07-24 RX ORDER — DROSPIRENONE 4 MG/1
1 TABLET, FILM COATED ORAL DAILY
Qty: 84 TABLET | Refills: 4 | Status: SHIPPED | OUTPATIENT
Start: 2025-07-24

## 2025-07-24 NOTE — PROGRESS NOTES
Lilliam Car is a ,  45 y.o. female White (non-) whose LMP was on 7/15/2025 who presents for her annual checkup. She is having no problems.    Menstrual status:    Her periods are light in flow, regular    She denies dysmenorrhea.    Contraception:    The current method of family planning is OCP (estrogen/progesterone).    Sexual history:    She  reports being sexually active and has had partner(s) who are male. She reports using the following method of birth control/protection: Pill.        Pap and Mammogram History:  Last Pap: normal HPV negative obtained 2023  She does not have a history of NAVARRO 2, 3 or cervical cancer.   Last Mammogram: had it today in our office   Last Bone Density: does not meet criteria  Last colonoscopy: has not had one yet              Breast Cancer History    She has no family history of breast cancer.    Family History   Problem Relation Age of Onset    Cancer Maternal Grandfather        Past Medical History:   Diagnosis Date    Abnormal Pap smear     H/O placenta previa      Past Surgical History:   Procedure Laterality Date     DELIVERY ONLY       SECTION  10/14/2009,     COLPOSCOPY  2018    JOSELITO pap--> colpo and EMB neg    GYN  ,      &      Current Outpatient Medications   Medication Sig Dispense Refill    Norethin Ace-Eth Estrad-FE (JUNEL FE 24) 1-20 MG-MCG(24) TABS Take 1 tablet by mouth daily 3 packet 4     No current facility-administered medications for this visit.     Allergies: Patient has no known allergies.   Tobacco History:  reports that she has never smoked. She has been exposed to tobacco smoke. She has never used smokeless tobacco.  Alcohol Abuse:  reports no history of alcohol use.  Drug Abuse:  reports no history of drug use.  Patient Active Problem List   Diagnosis    Atypical glandular cells of undetermined significance (JOSELITO) on cervical Pap smear           Physical Exam    BP (!) 148/95   Pulse 98

## 2025-07-24 NOTE — PROGRESS NOTES
Lilliam Car is a 45 y.o. female returns for an annual exam     Chief Complaint   Patient presents with    Annual Exam       Patient's last menstrual period was 07/15/2025 (exact date).  Her periods are light in flow and usually regular with a 26-32 day interval with 3-7 day duration.  She does not have dysmenorrhea.  Problems: no problems  Birth Control: OCP (estrogen/progesterone).  Last Pap: normal HPV negative obtained 4/18/2023  She does not have a history of NAVARRO 2, 3 or cervical cancer.   Last Mammogram: had it today in our office   Last Bone Density: does not meet criteria  Last colonoscopy: has not had one yet      1. Have you been to the ER, urgent care clinic, or hospitalized since your last visit? No    2. Have you seen or consulted any other health care providers outside of the [facility] since your last visit? No    Examination chaperoned by Alejandro Lambert MA.